# Patient Record
Sex: FEMALE | Employment: OTHER | URBAN - METROPOLITAN AREA
[De-identification: names, ages, dates, MRNs, and addresses within clinical notes are randomized per-mention and may not be internally consistent; named-entity substitution may affect disease eponyms.]

---

## 2024-08-06 ENCOUNTER — HOSPITAL ENCOUNTER (EMERGENCY)
Facility: HOSPITAL | Age: 64
Discharge: HOME | End: 2024-08-06
Attending: EMERGENCY MEDICINE
Payer: COMMERCIAL

## 2024-08-06 VITALS
TEMPERATURE: 97.5 F | HEART RATE: 88 BPM | OXYGEN SATURATION: 96 % | WEIGHT: 150 LBS | RESPIRATION RATE: 16 BRPM | BODY MASS INDEX: 24.99 KG/M2 | DIASTOLIC BLOOD PRESSURE: 86 MMHG | SYSTOLIC BLOOD PRESSURE: 143 MMHG | HEIGHT: 65 IN

## 2024-08-06 DIAGNOSIS — S89.92XA INJURY OF LEFT LOWER EXTREMITY, INITIAL ENCOUNTER: Primary | ICD-10-CM

## 2024-08-06 PROCEDURE — 99282 EMERGENCY DEPT VISIT SF MDM: CPT

## 2024-08-06 PROCEDURE — 99283 EMERGENCY DEPT VISIT LOW MDM: CPT

## 2024-08-06 ASSESSMENT — PAIN - FUNCTIONAL ASSESSMENT: PAIN_FUNCTIONAL_ASSESSMENT: 0-10

## 2024-08-06 ASSESSMENT — COLUMBIA-SUICIDE SEVERITY RATING SCALE - C-SSRS
6. HAVE YOU EVER DONE ANYTHING, STARTED TO DO ANYTHING, OR PREPARED TO DO ANYTHING TO END YOUR LIFE?: NO
1. IN THE PAST MONTH, HAVE YOU WISHED YOU WERE DEAD OR WISHED YOU COULD GO TO SLEEP AND NOT WAKE UP?: NO
2. HAVE YOU ACTUALLY HAD ANY THOUGHTS OF KILLING YOURSELF?: NO

## 2024-08-06 ASSESSMENT — PAIN SCALES - GENERAL: PAINLEVEL_OUTOF10: 6

## 2024-08-06 NOTE — DISCHARGE INSTRUCTIONS
Please follow-up with a sports medicine provider for further evaluation of your left leg injury. take ibuprofen 600 mg every 6 hours for pain relief.  Return to the emergency department if your leg pain worsens, you are unable to walk on your left leg, the tingling in your foot worsens, or any new symptoms began.

## 2024-08-06 NOTE — ED PROVIDER NOTES
"HPI   Chief Complaint   Patient presents with    Leg Injury       HPI  Tye Harry is a 63-year-old female presenting with left lower extremity injury.  Patient states about 2 hours ago she was walking her dog and her dog lunged towards another dog.  Patient states when this happened her dog pulled her and she went \"airborne.\"  Patient states she fell after she was \"airborne\" and did not hit her head or lose consciousness during the fall.  Patient states she landed on her left elbow and leg.  Patient denies pain to the left elbow and states there are other small abrasions that are not bleeding.  Patient states she had tingling to her left foot initially after the fall however the tingling has now improved.  Patient states she had pain to the left lower extremity after the fall.  Patient states he took Advil after the fall and denies current pain.  Patient states she was able to walk after the fall however her left leg felt \"weak\" when she put weight on it.  Patient denies chest pain, shortness of breath, abdominal pain, nausea, vomiting, fevers.      Patient History   No past medical history on file.  No past surgical history on file.  No family history on file.  Social History     Tobacco Use    Smoking status: Not on file    Smokeless tobacco: Not on file   Substance Use Topics    Alcohol use: Not on file    Drug use: Not on file       Physical Exam   ED Triage Vitals [08/06/24 1224]   Temperature Heart Rate Respirations BP   36.4 °C (97.5 °F) 88 16 143/86      Pulse Ox Temp src Heart Rate Source Patient Position   96 % -- -- --      BP Location FiO2 (%)     -- --       Physical Exam  Vitals and nursing note reviewed.   Constitutional:       Appearance: Normal appearance.   Cardiovascular:      Rate and Rhythm: Normal rate and regular rhythm.      Heart sounds: Normal heart sounds. No murmur heard.     No gallop.   Pulmonary:      Effort: Pulmonary effort is normal. No respiratory distress.      Breath " "sounds: Normal breath sounds. No stridor. No wheezing, rhonchi or rales.   Musculoskeletal:      Right upper leg: No swelling, tenderness or bony tenderness.      Left upper leg: No swelling, tenderness or bony tenderness.      Right knee: Normal. No swelling or bony tenderness. Normal range of motion. No tenderness.      Left knee: Normal. No swelling or bony tenderness. Normal range of motion. No tenderness.      Right lower leg: No tenderness or bony tenderness. No edema.      Left lower leg: No tenderness or bony tenderness. No edema.      Comments: Patient able to hold left leg in flexion and knee in extension    Skin:     General: Skin is warm and dry.   Neurological:      General: No focal deficit present.      Mental Status: She is alert and oriented to person, place, and time.      Gait: Gait is intact.      Comments: Patient is able to walk without complication however left leg appears stiff   Psychiatric:         Mood and Affect: Mood normal.         Behavior: Behavior normal.           ED Course & MDM   Diagnoses as of 08/06/24 1313   Injury of left lower extremity, initial encounter                       Caleb Coma Scale Score: 15                        Medical Decision Making  This is a 63-year-old female presenting with left lower extremity injury.  Patient states she was walking her dog about 2 hours ago and the dog lunged forward.  Patient states she went \"airborne\" when the dog lunged forward with culture of fall.  Patient states she did not hit her head or lose consciousness during the fall.  Patient states she landed on her left elbow and leg.  Patient does have abrasions to the left elbow that are not bleeding at this time.  Patient denies pain to the left elbow.  Patient states she had pain to the left lower extremity after the fall.  Patient states she also had tingling sensation to her left foot.  Patient states her left leg felt weak when she put weight on it.  Patient states she took " Advil after the fall.  Patient denies current pain to the left lower extremity.  Patient also states the tingling in the left foot has improved since the fall.  Patient attempted to walk in the emergency department today and was able to walk without complication.  Patient states her left leg feels better and does not feel as weak when she puts weight on it.  Patient was able to hold her left leg straight in flexion therefore patellar or quadricep tendon rupture is not suspected at this time.  Since patient is able to walk without complication, pain has been relieved with Advil, and left lower extremity is nontender there is low concern for fracture at this time.  Discussed with patient that x-ray of the left lower leg and knee can be obtained today to rule out fracture however concern for fracture is low.  Patient states that she does not want the x-ray since she is feeling better since being in the emergency department.  Discussed with patient that she will need to follow-up with sports medicine provider for further evaluation of the left knee and leg injury.  Patient was advised to take ibuprofen 600 mg every 6 hours for pain relief.  Patient states she can buy the medication over-the-counter and does not need a prescription today.  Patient has remained hemodynamically stable while in the emergency department today.    Disposition: Discharge home  Patient was advised to follow-up with sports medicine provider for further evaluation of left knee and leg injury.  Patient was given referral for sports medicine and contact information for Dr. Ervin.  Patient was also advised to follow-up with her primary care provider as needed.  Strict return precautions were given.  Plan was discussed with the patient and the patient understands and agrees.  Patient was discharged in stable condition.    Procedure  Procedures      63-year-old female presents with left leg pain.  Patient was walking her dog when it got into an  altercation with another dog pulling her forward and off her feet.  She landed onto her left leg.  Originally she had difficulty bearing weight on that left leg and hence came to the emergency department.  After ibuprofen patient has a much better range of motion.  On physical exam patient does not have any pain in the hip but does have some lateral knee joint tenderness.  Patient has intact extensor function no calf or Achilles pain and no pain into the ankle or the forefoot.  Negative anterior posterior drawer.  Intact popliteal pulse.  Patient is able to walk though a bit stiff with that left leg.  Differential diagnosis includes knee sprain more specifically meniscal pathology, unlikely patellar pathology or fracture, cruciate ligament injury.  Patient now able to ambulate and has much reduced pain after anti-inflammatories.  We discussed the possibility of getting an x-ray of the left knee though my concern for fracture was low as is the patient's.  Through shared decision making we decided to forego that imaging.  Patient may just have a knee sprain otherwise specified but we will refer her to outpatient sports medicine with a can follow-up with an exam and MRI if necessary.  Patient told to maintain her active lifestyle as long as is not causing her pain and to use as needed ibuprofen     Tommy Person PA-C  08/07/24 [id number]

## 2024-08-09 DIAGNOSIS — M25.562 LEFT KNEE PAIN, UNSPECIFIED CHRONICITY: ICD-10-CM

## 2024-08-12 ENCOUNTER — APPOINTMENT (OUTPATIENT)
Dept: ORTHOPEDIC SURGERY | Facility: CLINIC | Age: 64
End: 2024-08-12
Payer: COMMERCIAL

## 2024-08-12 ENCOUNTER — HOSPITAL ENCOUNTER (OUTPATIENT)
Dept: RADIOLOGY | Facility: EXTERNAL LOCATION | Age: 64
Discharge: HOME | End: 2024-08-12

## 2024-08-12 ENCOUNTER — HOSPITAL ENCOUNTER (OUTPATIENT)
Dept: RADIOLOGY | Facility: CLINIC | Age: 64
Discharge: HOME | End: 2024-08-12
Payer: COMMERCIAL

## 2024-08-12 DIAGNOSIS — M25.562 LEFT KNEE PAIN, UNSPECIFIED CHRONICITY: ICD-10-CM

## 2024-08-12 DIAGNOSIS — S89.92XA INJURY OF LEFT LOWER EXTREMITY, INITIAL ENCOUNTER: ICD-10-CM

## 2024-08-12 DIAGNOSIS — S83.419A SPRAIN OF MEDIAL COLLATERAL LIGAMENT OF KNEE, INITIAL ENCOUNTER: Primary | ICD-10-CM

## 2024-08-12 DIAGNOSIS — M25.462 EFFUSION OF LEFT KNEE: ICD-10-CM

## 2024-08-12 PROCEDURE — 73564 X-RAY EXAM KNEE 4 OR MORE: CPT | Mod: LT

## 2024-08-12 PROCEDURE — 73564 X-RAY EXAM KNEE 4 OR MORE: CPT | Mod: LEFT SIDE | Performed by: RADIOLOGY

## 2024-08-12 PROCEDURE — 20611 DRAIN/INJ JOINT/BURSA W/US: CPT | Performed by: EMERGENCY MEDICINE

## 2024-08-12 PROCEDURE — 99204 OFFICE O/P NEW MOD 45 MIN: CPT | Performed by: EMERGENCY MEDICINE

## 2024-08-12 RX ORDER — FLUOXETINE HYDROCHLORIDE 20 MG/1
20 CAPSULE ORAL DAILY
COMMUNITY

## 2024-08-12 RX ORDER — LIDOCAINE HYDROCHLORIDE 10 MG/ML
2 INJECTION INFILTRATION; PERINEURAL
Status: COMPLETED | OUTPATIENT
Start: 2024-08-12 | End: 2024-08-12

## 2024-08-12 ASSESSMENT — PAIN - FUNCTIONAL ASSESSMENT: PAIN_FUNCTIONAL_ASSESSMENT: 0-10

## 2024-08-12 ASSESSMENT — PAIN DESCRIPTION - DESCRIPTORS: DESCRIPTORS: ACHING;TINGLING

## 2024-08-12 ASSESSMENT — PAIN SCALES - GENERAL: PAINLEVEL_OUTOF10: 4

## 2024-08-12 NOTE — PROGRESS NOTES
Subjective    Patient ID: Kamryn Gamble is a 63 y.o. female.    Chief Complaint: Pain of the Left Knee (NPV - leg bucked inward 3 weeks ago when a dog ran into her. 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  She is a  and had a dog hit her on the outside of her left knee about 3 weeks ago applying a valgus force.  She had some pain around that time but began RICE therapy.  6 days ago however, a bunch of dogs came running at her and she says that she does not remember what happened but it caused her to fall and she feels like her left knee gave out.  She landed on her left side and did not hit her head.  Since that time she has had some swelling in her left knee with pain and bruising that extends down into the left lower extremity.  Most of her pain is lateral.  She rates it as 4 or 5 out of 10.  She has tightness with knee flexion and thinks that she may have an effusion.  She has been using Tylenol and avoiding NSAIDs to hopefully promote healing.  She would like to avoid surgery if at all possible.  No other complaints here today.        Objective   Right Knee Exam   Right knee exam is normal.      Left Knee Exam     Muscle Strength   The patient has normal left knee strength.    Tenderness   The patient is experiencing tenderness in the MCL and lateral joint line (Some discomfort over the proximal fibular head).    Range of Motion   Extension:  normal   Flexion:  abnormal     Tests   Becky:  Medial - negative Lateral - negative   Valgus: positive  Lachman:  Anterior - negative      Drawer:  Anterior - negative     Posterior - negative  Patellar apprehension: negative (Negative patellar grind testing, patellar crepitus is present throughout range of motion testing)    Other   Erythema: absent  Scars: absent  Sensation: normal  Pulse:  present  Swelling: moderate  Effusion: effusion present    Comments:  Extension is intact against resistance.  She does have bruising extending down over the anterior aspect of her left lower extremity/shin but is nontender            Image Results:  X-rays of the left knee were reviewed and interpreted by me on 8/12/2024 and revealed some mild degenerative changes without any obvious acute injuries or fractures.  There is a small area over the proximal fibular shaft that looks like it could be consistent with a subacute/remote fibular shaft fracture that has healed.    Patient ID: Kamryn Gamble is a 63 y.o. female.    L Inj/Asp: L knee on 8/12/2024 4:13 PM  Indications: pain and joint swelling  Details: 18 G needle, ultrasound-guided superolateral approach  Medications: 2 mL lidocaine 10 mg/mL (1 %)  Aspirate: 25 mL blood-tinged and clear  Outcome: tolerated well, no immediate complications  Procedure, treatment alternatives, risks and benefits explained, specific risks discussed. Consent was given by the patient. Immediately prior to procedure a time out was called to verify the correct patient, procedure, equipment, support staff and site/side marked as required. Patient was prepped and draped in the usual sterile fashion.           Assessment/Plan   Encounter Diagnoses:  Sprain of medial collateral ligament of knee, initial encounter    Injury of left lower extremity, initial encounter    Effusion of left knee    Orders Placed This Encounter    L Inj/Asp    Knee Brace, Hinged    Point of Care Ultrasound    Referral to Physical Therapy     No follow-ups on file.    We discussed her treatment options at length and I did offer her an MRI and possible surgical referral but instead we decided to pursue nonoperative/conservative treatment options first.  We therefore decided to aspirate the joint without a cortisone injection here today and successfully drained 25 cc of blood-tinged fluid from the left  knee.  The patient felt much better after the procedure which she tolerated well without any complications and activity modifications were reviewed.  We also placed her in a hinged knee brace and she agreed to start physical therapy with an emphasis on developing and implementing a home exercise program.  I will follow-up with her in about 4 weeks to determine how she is responding to this plan and whether or not she would like to obtain an MRI at that time or do a possible injection based on how she is feeling.    **Patient was prescribed a hinged knee brace for [MCL sprain].The patient is ambulatory with or without aid; but, has weakness, instability and/or deformity of their [left knee] which requires stabilization from this orthosis to improve their function.       Verbal and written instructions for the use, wear schedule, cleaning and application of this item were given.  Patient was instructed that should the brace result in increased pain, decreased sensation, increased swelling, or an overall worsening of their medical condition, to please contact our office immediately.      Orthotic management and training was provided for skin care, modifications due to healing tissues, edema changes, interruption in skin integrity, and safety precautions with the orthosis.**    ** Please excuse any errors in grammar or translation related to this dictation. Voice recognition software was utilized to prepare this document. **       Tito Soto MD  [facility]

## 2024-09-09 ENCOUNTER — APPOINTMENT (OUTPATIENT)
Dept: ORTHOPEDIC SURGERY | Facility: CLINIC | Age: 64
End: 2024-09-09
Payer: COMMERCIAL

## 2024-09-11 ENCOUNTER — APPOINTMENT (OUTPATIENT)
Dept: ORTHOPEDIC SURGERY | Facility: CLINIC | Age: 64
End: 2024-09-11
Payer: COMMERCIAL

## 2024-09-11 DIAGNOSIS — M25.462 EFFUSION OF LEFT KNEE: Primary | ICD-10-CM

## 2024-09-11 PROCEDURE — 99213 OFFICE O/P EST LOW 20 MIN: CPT | Performed by: EMERGENCY MEDICINE

## 2024-09-11 ASSESSMENT — PAIN SCALES - GENERAL: PAINLEVEL_OUTOF10: 6

## 2024-09-11 ASSESSMENT — PAIN DESCRIPTION - DESCRIPTORS: DESCRIPTORS: SHOOTING;DULL;ACHING

## 2024-09-11 ASSESSMENT — PAIN - FUNCTIONAL ASSESSMENT: PAIN_FUNCTIONAL_ASSESSMENT: 0-10

## 2024-09-11 NOTE — PROGRESS NOTES
FUV left knee     Subjective    Patient ID: Kamryn Gamble is a 63 y.o. female.    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  She is a  and had a dog hit her on the outside of her left knee about 3 weeks ago applying a valgus force.  She had some pain around that time but began RICE therapy.  6 days ago however, a bunch of dogs came running at her and she says that she does not remember what happened but it caused her to fall and she feels like her left knee gave out.  She landed on her left side and did not hit her head.  Since that time she has had some swelling in her left knee with pain and bruising that extends down into the left lower extremity.  Most of her pain is lateral.  She rates it as 4 or 5 out of 10.  She has tightness with knee flexion and thinks that she may have an effusion.  She has been using Tylenol and avoiding NSAIDs to hopefully promote healing.  She would like to avoid surgery if at all possible.  No other complaints here today. I did offer her an MRI and possible surgical referral but instead we decided to pursue nonoperative/conservative treatment options first.  We therefore decided to aspirate the joint without a cortisone injection here today and successfully drained 25 cc of blood-tinged fluid from the left knee.  The patient felt much better after the procedure which she tolerated well without any complications and activity modifications were reviewed.  We also placed her in a hinged knee brace and she agreed to start physical therapy with an emphasis on developing and implementing a home exercise program.  I will follow-up with her in about 4 weeks to determine how she is responding to this plan and whether or not she would like to obtain an MRI at that time or do a  possible injection based on how she is feeling.    Update on 9/11/2024. 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  She tried the hinged knee brace but says that it was starting to make her pain worse and cause her to have pains in other parts of her body so she discontinued it.  She has been compliant with physical therapy but has not noticed complete improvement.  She thinks that she is about 60% better but is still having some diffuse pain, complaints of instability with episodes of her knee giving out, and decreased range of motion.  Most of her pain today is actually anterior lateral left knee and radiates into the posterior aspect near her distal hamstring.        Objective   Right Knee Exam   Right knee exam is normal.      Left Knee Exam     Muscle Strength   The patient has normal left knee strength.    Tenderness   The patient is experiencing tenderness in the lateral joint line (Some tenderness to palpation over the lateral joint line and distal hamstring area).    Range of Motion   Extension:  normal   Flexion:  abnormal     Tests   Lachman:  Anterior - negative      Drawer:  Anterior - negative     Posterior - negative  Patellar apprehension: negative (Negative patellar grind testing, patellar crepitus is present throughout range of motion testing)    Other   Erythema: absent  Scars: absent  Sensation: normal  Pulse: present  Swelling: mild  Effusion: effusion present    Comments:  Extension is intact against resistance.  Bruising has resolved.  Trace effusion.  Weakly positive Becky testing.            Image Results:  No new imaging today    Patient ID: Kamryn Gamble is a 63 y.o. female.    Procedures    Assessment/Plan   Encounter Diagnoses:  Effusion of left knee    Orders Placed This Encounter    MR knee left wo IV contrast     No follow-ups on file.    We discussed her treatment options and at this point because she is not fully responding to  conservative treatment options, we did decide to pursue a left knee MRI.  She will continue with rehab and range of motion/strengthening exercises at home and I will follow-up with her after her MRI.    ** Please excuse any errors in grammar or translation related to this dictation. Voice recognition software was utilized to prepare this document. **       Tito Soto MD  [facility]

## 2024-09-25 ENCOUNTER — TELEMEDICINE (OUTPATIENT)
Dept: ORTHOPEDIC SURGERY | Facility: CLINIC | Age: 64
End: 2024-09-25
Payer: COMMERCIAL

## 2024-09-25 ENCOUNTER — HOSPITAL ENCOUNTER (OUTPATIENT)
Dept: RADIOLOGY | Facility: CLINIC | Age: 64
Discharge: HOME | End: 2024-09-25
Payer: COMMERCIAL

## 2024-09-25 DIAGNOSIS — S83.282S ACUTE LATERAL MENISCUS TEAR OF LEFT KNEE, SEQUELA: ICD-10-CM

## 2024-09-25 DIAGNOSIS — S83.519D SPRAIN OF ANTERIOR CRUCIATE LIGAMENT OF KNEE, SUBSEQUENT ENCOUNTER: Primary | ICD-10-CM

## 2024-09-25 DIAGNOSIS — M25.462 EFFUSION OF LEFT KNEE: ICD-10-CM

## 2024-09-25 PROCEDURE — 99441 PR PHYS/QHP TELEPHONE EVALUATION 5-10 MIN: CPT | Performed by: EMERGENCY MEDICINE

## 2024-09-25 PROCEDURE — 73721 MRI JNT OF LWR EXTRE W/O DYE: CPT | Mod: LEFT SIDE | Performed by: RADIOLOGY

## 2024-09-25 PROCEDURE — 73721 MRI JNT OF LWR EXTRE W/O DYE: CPT | Mod: LT

## 2024-09-25 NOTE — PROGRESS NOTES
FUV left knee     Subjective    Patient ID: Kamryn Gamble is a 64 y.o. female.    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  She is a  and had a dog hit her on the outside of her left knee about 3 weeks ago applying a valgus force.  She had some pain around that time but began RICE therapy.  6 days ago however, a bunch of dogs came running at her and she says that she does not remember what happened but it caused her to fall and she feels like her left knee gave out.  She landed on her left side and did not hit her head.  Since that time she has had some swelling in her left knee with pain and bruising that extends down into the left lower extremity.  Most of her pain is lateral.  She rates it as 4 or 5 out of 10.  She has tightness with knee flexion and thinks that she may have an effusion.  She has been using Tylenol and avoiding NSAIDs to hopefully promote healing.  She would like to avoid surgery if at all possible.  No other complaints here today. I did offer her an MRI and possible surgical referral but instead we decided to pursue nonoperative/conservative treatment options first.  We therefore decided to aspirate the joint without a cortisone injection here today and successfully drained 25 cc of blood-tinged fluid from the left knee.  The patient felt much better after the procedure which she tolerated well without any complications and activity modifications were reviewed.  We also placed her in a hinged knee brace and she agreed to start physical therapy with an emphasis on developing and implementing a home exercise program.  I will follow-up with her in about 4 weeks to determine how she is responding to this plan and whether or not she would like to obtain  an MRI at that time or do a possible injection based on how she is feeling.    Update on 9/11/2024. 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  She tried the hinged knee brace but says that it was starting to make her pain worse and cause her to have pains in other parts of her body so she discontinued it.  She has been compliant with physical therapy but has not noticed complete improvement.  She thinks that she is about 60% better but is still having some diffuse pain, complaints of instability with episodes of her knee giving out, and decreased range of motion.  Most of her pain today is actually anterior lateral left knee and radiates into the posterior aspect near her distal hamstring. We we did decided to pursue a left knee MRI.  She will continue with rehab and range of motion/strengthening exercises at home and I will follow-up with her after her MRI.    Update on 9/25/2024.  I did a virtual visit with the patient with audio capabilities only to review her MRI.  She has a large effusion with a sprained ACL and a meniscal tear.  She is still having symptoms.        Objective   Ortho Exam  No exam, televisit    Image Results:  MRI of the left knee was reviewed and interpreted by me on 9/25/2024.  I agree with the radiologist's findings and interpretations.    Patient ID: Kamryn Gamble is a 64 y.o. female.    Procedures    Assessment/Plan   Encounter Diagnoses:  Sprain of anterior cruciate ligament of knee, subsequent encounter    Acute lateral meniscus tear of left knee, sequela    No orders of the defined types were placed in this encounter.    No follow-ups on file.    We discussed her treatment options over the phone and ultimately decided to refer her to see my surgical colleague, Dr. EDWARD.  They will discuss potential surgical treatment options and if they decide to continue a nonsurgical treatment route, I am happy to see the patient back as needed and as  soon as possible.  The neck step in my treatment program would likely be continued therapy, bracing, and possible injections.    I spent 7 min with the patient reviewing her imaging results and the chart. Greater than 50% of the time was spent with the patient discussing the results and coming up with the treatment plan.    ** Please excuse any errors in grammar or translation related to this dictation. Voice recognition software was utilized to prepare this document. **       Tito Soto MD  [facility] Medicine

## 2024-10-11 ENCOUNTER — APPOINTMENT (OUTPATIENT)
Dept: ORTHOPEDIC SURGERY | Facility: HOSPITAL | Age: 64
End: 2024-10-11
Payer: COMMERCIAL

## 2025-04-22 ENCOUNTER — APPOINTMENT (OUTPATIENT)
Dept: OBSTETRICS AND GYNECOLOGY | Facility: CLINIC | Age: 65
End: 2025-04-22
Payer: COMMERCIAL

## 2025-04-22 VITALS
BODY MASS INDEX: 26.02 KG/M2 | HEIGHT: 64 IN | SYSTOLIC BLOOD PRESSURE: 128 MMHG | WEIGHT: 152.4 LBS | DIASTOLIC BLOOD PRESSURE: 70 MMHG

## 2025-04-22 DIAGNOSIS — Z79.890 MENOPAUSAL SYNDROME ON HORMONE REPLACEMENT THERAPY: Primary | ICD-10-CM

## 2025-04-22 DIAGNOSIS — Z13.820 SCREENING FOR OSTEOPOROSIS: ICD-10-CM

## 2025-04-22 DIAGNOSIS — Z12.39 BREAST CANCER SCREENING OTHER THAN MAMMOGRAM: ICD-10-CM

## 2025-04-22 DIAGNOSIS — Z12.31 BREAST CANCER SCREENING BY MAMMOGRAM: ICD-10-CM

## 2025-04-22 DIAGNOSIS — N95.1 MENOPAUSAL SYNDROME ON HORMONE REPLACEMENT THERAPY: Primary | ICD-10-CM

## 2025-04-22 PROCEDURE — 3008F BODY MASS INDEX DOCD: CPT | Performed by: NURSE PRACTITIONER

## 2025-04-22 PROCEDURE — 1036F TOBACCO NON-USER: CPT | Performed by: NURSE PRACTITIONER

## 2025-04-22 PROCEDURE — 99204 OFFICE O/P NEW MOD 45 MIN: CPT | Performed by: NURSE PRACTITIONER

## 2025-04-22 RX ORDER — PROGESTERONE 100 MG/1
100 CAPSULE ORAL DAILY
Qty: 30 CAPSULE | Refills: 11 | Status: SHIPPED | OUTPATIENT
Start: 2025-04-22

## 2025-04-22 RX ORDER — ALPRAZOLAM 0.25 MG/1
1 TABLET ORAL NIGHTLY PRN
COMMUNITY
Start: 2025-04-07

## 2025-04-22 RX ORDER — ESTRADIOL 0.04 MG/D
1 FILM, EXTENDED RELEASE TRANSDERMAL 2 TIMES WEEKLY
Qty: 8 PATCH | Refills: 11 | Status: SHIPPED | OUTPATIENT
Start: 2025-04-24 | End: 2026-04-24

## 2025-04-22 ASSESSMENT — ENCOUNTER SYMPTOMS
GASTROINTESTINAL NEGATIVE: 0
CARDIOVASCULAR NEGATIVE: 0
ENDOCRINE NEGATIVE: 0
EYES NEGATIVE: 0
PSYCHIATRIC NEGATIVE: 0
CONSTITUTIONAL NEGATIVE: 0
RESPIRATORY NEGATIVE: 0
MUSCULOSKELETAL NEGATIVE: 0
ALLERGIC/IMMUNOLOGIC NEGATIVE: 0
HEMATOLOGIC/LYMPHATIC NEGATIVE: 0
NEUROLOGICAL NEGATIVE: 0

## 2025-04-22 ASSESSMENT — PAIN SCALES - GENERAL: PAINLEVEL_OUTOF10: 0-NO PAIN

## 2025-04-22 NOTE — PROGRESS NOTES
"Subjective   Patient ID: Kamryn Gamble is a 64 y.o. female who presents for Menopause.  HPI    Concerns: states she feels like she is \"aging very quickly\", joint pain worsening   Ovarian cyst- dermoid tumor removed about 30 years ago, has had ultrasound   Strip that led to a D&C- that was negative  Multiple abnormal pap smears   Zepbond- for about 2 weeks, developed acute pancreatitis, back on the medication.     Menopausal age: 54     History of a DVT/PE: no   History of HTN: no  History of elevated lipids: yes   Tobacco use: no   Personal history of breast cancer: no         HT: estrogen cream- no longer using it, helped with itching and dryness   use of OTC remedies: none     Vaginal bleeding: none   VMS: night sweats/issues with temperature regulation   Sleep difficulties: yes, waking up in the middle of the night for the past 5 years   Mood changes: no   Joint pain: yes   Brain fog/difficulty concentrating: yes     GSM: no   are you sexually active: no   dyspareunia: no    decrease in desire: no   difficulty reaching orgasm: no, never had an orgasm   Urinary Incontinence: rarely        Fractures in menopause: tibial platue fracture   Calcium intake: yes   Vitamin D intake: yes   Increased risk for osteoporosis: mother has a h/o osteoporosis        FH:   breast cancer:  no   ovarian cancer: no   colon cancer: no   pancreatic cancer: no     Social history:  lives with:  and 2 dogs     occupation: retired physician  Exercise: piliates, working with a    weight bearing: none     Review of Systems    Objective   Physical Exam    Assessment/Plan   Diagnoses and all orders for this visit:  Menopausal syndrome on hormone replacement therapy  -     estradiol (Vivelle-DOT) 0.0375 mg/24 hr; Place 1 patch over 96 hours on the skin 2 times a week.  -     progesterone (Prometrium) 100 mg capsule; Take 1 capsule (100 mg) by mouth once daily. Take at bedtime  Screening for osteoporosis  -     XR " DEXA bone density; Future  Breast cancer screening other than mammogram  -     MR breast bilateral w IV contrast fast screening self pay; Future  Breast cancer screening by mammogram  -     BI mammo bilateral screening tomosynthesis; Future    Decision for MHT for both her bothersome symptoms and her increased risk of osteoporosis  We discussed the fast breast MRI as an option to her h/o dense breast tissue  Risks/benefits of MHT reviewed; The Menopause Society recommended as a resource and the book, Becoming Cliterate also recommended; samples of trin[place] and Ristela given  Follow up for annual exam and to discuss efficacy of MHT       YAMILETH Cruz-CNP 04/22/25 1:53 PM

## 2025-04-30 ENCOUNTER — APPOINTMENT (OUTPATIENT)
Dept: RADIOLOGY | Facility: CLINIC | Age: 65
End: 2025-04-30
Payer: COMMERCIAL

## 2025-05-06 ENCOUNTER — APPOINTMENT (OUTPATIENT)
Dept: RADIOLOGY | Facility: CLINIC | Age: 65
End: 2025-05-06
Payer: COMMERCIAL

## 2025-05-06 VITALS — HEIGHT: 64 IN | BODY MASS INDEX: 26.01 KG/M2 | WEIGHT: 152.34 LBS

## 2025-05-06 DIAGNOSIS — Z12.31 BREAST CANCER SCREENING BY MAMMOGRAM: ICD-10-CM

## 2025-05-06 PROCEDURE — 77067 SCR MAMMO BI INCL CAD: CPT | Performed by: RADIOLOGY

## 2025-05-06 PROCEDURE — 77063 BREAST TOMOSYNTHESIS BI: CPT | Performed by: RADIOLOGY

## 2025-05-06 PROCEDURE — 77063 BREAST TOMOSYNTHESIS BI: CPT

## 2025-05-09 ENCOUNTER — HOSPITAL ENCOUNTER (OUTPATIENT)
Dept: RADIOLOGY | Facility: EXTERNAL LOCATION | Age: 65
Discharge: HOME | End: 2025-05-09

## 2025-05-15 ENCOUNTER — HOSPITAL ENCOUNTER (OUTPATIENT)
Dept: RADIOLOGY | Facility: CLINIC | Age: 65
Discharge: HOME | End: 2025-05-15
Payer: COMMERCIAL

## 2025-05-15 DIAGNOSIS — Z13.820 SCREENING FOR OSTEOPOROSIS: ICD-10-CM

## 2025-05-15 PROCEDURE — 77080 DXA BONE DENSITY AXIAL: CPT

## 2025-05-19 ENCOUNTER — HOSPITAL ENCOUNTER (OUTPATIENT)
Dept: RADIOLOGY | Facility: HOSPITAL | Age: 65
Discharge: HOME | End: 2025-05-19

## 2025-05-19 DIAGNOSIS — Z12.39 BREAST CANCER SCREENING OTHER THAN MAMMOGRAM: ICD-10-CM

## 2025-05-19 PROCEDURE — 6100000003 BI MR BREAST BILATERAL WITH CONTRAST FAST SCREENING SELF PAY

## 2025-05-19 PROCEDURE — 2550000001 HC RX 255 CONTRASTS: Performed by: NURSE PRACTITIONER

## 2025-05-19 PROCEDURE — A9575 INJ GADOTERATE MEGLUMI 0.1ML: HCPCS | Performed by: NURSE PRACTITIONER

## 2025-05-19 RX ORDER — GADOTERATE MEGLUMINE 376.9 MG/ML
15 INJECTION INTRAVENOUS
Status: COMPLETED | OUTPATIENT
Start: 2025-05-19 | End: 2025-05-19

## 2025-05-19 RX ADMIN — GADOTERATE MEGLUMINE 14 ML: 376.9 INJECTION INTRAVENOUS at 10:02

## 2025-05-22 ENCOUNTER — APPOINTMENT (OUTPATIENT)
Dept: RADIOLOGY | Facility: HOSPITAL | Age: 65
End: 2025-05-22
Payer: COMMERCIAL

## 2025-05-27 DIAGNOSIS — Z12.39 BREAST CANCER SCREENING OTHER THAN MAMMOGRAM: Primary | ICD-10-CM

## 2025-05-30 ENCOUNTER — APPOINTMENT (OUTPATIENT)
Dept: RADIOLOGY | Facility: HOSPITAL | Age: 65
End: 2025-05-30
Payer: COMMERCIAL

## 2025-09-12 ENCOUNTER — APPOINTMENT (OUTPATIENT)
Dept: OBSTETRICS AND GYNECOLOGY | Facility: CLINIC | Age: 65
End: 2025-09-12
Payer: COMMERCIAL